# Patient Record
Sex: FEMALE | Race: WHITE | NOT HISPANIC OR LATINO | URBAN - METROPOLITAN AREA
[De-identification: names, ages, dates, MRNs, and addresses within clinical notes are randomized per-mention and may not be internally consistent; named-entity substitution may affect disease eponyms.]

---

## 2020-12-15 PROBLEM — F90.9 ADULT ATTENTION DEFICIT HYPERACTIVITY DISORDER: Status: ACTIVE | Noted: 2017-10-02

## 2021-07-01 ENCOUNTER — PATIENT MESSAGE (OUTPATIENT)
Dept: ADMINISTRATIVE | Facility: OTHER | Age: 36
End: 2021-07-01

## 2022-01-10 PROBLEM — M54.50 LOW BACK PAIN: Status: ACTIVE | Noted: 2019-12-15

## 2022-01-10 PROBLEM — F41.9 ANXIETY: Status: ACTIVE | Noted: 2022-01-10

## 2022-01-10 PROBLEM — F90.0 ATTENTION DEFICIT HYPERACTIVITY DISORDER (ADHD), PREDOMINANTLY INATTENTIVE TYPE: Status: ACTIVE | Noted: 2022-01-10

## 2024-12-11 ENCOUNTER — OFFICE VISIT (OUTPATIENT)
Facility: CLINIC | Age: 39
End: 2024-12-11
Payer: COMMERCIAL

## 2024-12-11 DIAGNOSIS — J34.89 OTHER SPECIFIED DISORDERS OF NOSE AND NASAL SINUSES: Primary | ICD-10-CM

## 2024-12-11 DIAGNOSIS — J30.89 NON-SEASONAL ALLERGIC RHINITIS, UNSPECIFIED TRIGGER: ICD-10-CM

## 2024-12-11 DIAGNOSIS — J32.0 CHRONIC MAXILLARY SINUSITIS: ICD-10-CM

## 2024-12-11 DIAGNOSIS — R43.8 HYPOSMIA: ICD-10-CM

## 2024-12-11 PROCEDURE — 1159F MED LIST DOCD IN RCRD: CPT | Mod: CPTII,S$GLB,, | Performed by: ORTHOPAEDIC SURGERY

## 2024-12-11 PROCEDURE — 99204 OFFICE O/P NEW MOD 45 MIN: CPT | Mod: S$GLB,,, | Performed by: ORTHOPAEDIC SURGERY

## 2024-12-11 NOTE — PROGRESS NOTES
Subjective:      Patient ID: Shereen Rosenbaum is a 39 y.o. female.    Chief Complaint: Sinus Problem (Ongoing sinus congestion - states does not feel like her nose looks normal - thinks there are string/threads in her nose that move and feels like something is crawling inside.)    Patient is a 39 year old female seen today for evaluation of chronic sinusitis.  She has been having issues with nasal obstruction and unusual mucus since 2021.  She has been having a sensation of irritation in her nose, and on exam she has noted increased inflammation.  She also has noted an itching and crawling sensation in her nose.  She has increased nasal obstruction, decreased sense of smell.  She has noted thin, white and stringy mucus in her nose that has concerned her.  She has no formal diagnosis of asthma, but has an inhaler that she uses when she has a restriction to her breathing and she does find it to be helpful.  She has never required allergy testing in the past.  She does not smoke.  She has seen Dr. Julio prior, is here for second opinion.          Review of Systems   HENT:  Positive for congestion and rhinorrhea.    Allergic/Immunologic: Positive for environmental allergies.       Objective:       Physical Exam  Constitutional:       General: She is not in acute distress.     Appearance: She is well-developed.   HENT:      Head: Normocephalic and atraumatic.      Right Ear: Tympanic membrane, ear canal and external ear normal.      Left Ear: Tympanic membrane, ear canal and external ear normal.      Nose: Mucosal edema present. No nasal deformity, septal deviation or rhinorrhea.      Right Sinus: No maxillary sinus tenderness or frontal sinus tenderness.      Left Sinus: No maxillary sinus tenderness or frontal sinus tenderness.      Comments: Able to see head of middle turbinates bilaterally, possible tamela bullosa left middle turbinate     Mouth/Throat:      Mouth: Mucous membranes are not pale and not dry.       Dentition: No dental caries.      Pharynx: Uvula midline. No oropharyngeal exudate or posterior oropharyngeal erythema.   Eyes:      General: Lids are normal. No scleral icterus.     Extraocular Movements:      Right eye: Normal extraocular motion and no nystagmus.      Left eye: Normal extraocular motion and no nystagmus.      Conjunctiva/sclera: Conjunctivae normal.      Right eye: Right conjunctiva is not injected. No chemosis.     Left eye: Left conjunctiva is not injected. No chemosis.     Pupils: Pupils are equal, round, and reactive to light.   Neck:      Thyroid: No thyroid mass or thyromegaly.      Trachea: Trachea and phonation normal. No tracheal tenderness or tracheal deviation.   Pulmonary:      Effort: Pulmonary effort is normal. No respiratory distress.      Breath sounds: No stridor.   Abdominal:      General: There is no distension.   Lymphadenopathy:      Head:      Right side of head: No submental, submandibular, preauricular, posterior auricular or occipital adenopathy.      Left side of head: No submental, submandibular, preauricular, posterior auricular or occipital adenopathy.      Cervical: No cervical adenopathy.   Skin:     General: Skin is warm and dry.      Findings: No erythema or rash.   Neurological:      Mental Status: She is alert and oriented to person, place, and time.      Cranial Nerves: No cranial nerve deficit.   Psychiatric:         Behavior: Behavior normal.         Assessment:       1. Other specified disorders of nose and nasal sinuses    2. Chronic maxillary sinusitis    3. Hyposmia    4. Non-seasonal allergic rhinitis, unspecified trigger        Plan:     Other specified disorders of nose and nasal sinuses  -     CT Sinuses without Contrast; Future; Expected date: 12/11/2024    Chronic maxillary sinusitis  -     Ambulatory referral/consult to ENT  -     CT Sinuses without Contrast; Future; Expected date: 12/11/2024    Hyposmia  -     CT Sinuses without Contrast; Future;  Expected date: 12/11/2024    Non-seasonal allergic rhinitis, unspecified trigger    Patient has been having increasing sinonasal symptoms over the past three years.  We discussed that this is likely secondary to increased nasal inflammation, which can be a result of infection vs allergy.  With the duration of her symptoms and new onset of hyposmia, I would recommend a CT sinus.  Will schedule at [facility] and will review results when complete.  If her scan does not show any anatomic abnormality of the sinuses or active infection, then symptoms are most likely related to underlying allergy.  Next step at that point would be to consider evaluation with allergy for skin testing.  In the interim, will have her start with nasal saline spray twice daily as well as a nasal steroid spray.  We did discuss that nasal steroid sprays require use for 10-14 days before they achieve maximal effectiveness.  Will contact PCP to see if they would prefer local referral for allergy if indicated.

## 2024-12-11 NOTE — Clinical Note
Thank you for the referral- sending her for CT sinus.  If clear, I would recommend allergy testing- do you have someone local you prefer?  If not I can set her up with our [facility] group.  Thank you again and happy holidays!

## 2024-12-19 ENCOUNTER — TELEPHONE (OUTPATIENT)
Dept: OTOLARYNGOLOGY | Facility: CLINIC | Age: 39
End: 2024-12-19
Payer: COMMERCIAL

## 2024-12-19 NOTE — TELEPHONE ENCOUNTER
Per Dr. Lewis note dated 12/11/24:   I would recommend a CT sinus. Will schedule at [facility] and will review results when complete.     Has this been done and do we have these results?        ----- Message from Roel Hernandez sent at 12/18/2024  4:17 PM CST -----  Contact: [phone]    ----- Message -----  From: Alma Rosa Mi  Sent: 12/18/2024   4:03 PM CST  To: Debbie Soni Ent Staff    Patient is calling for LAB results. Please call patient at [phone]. Thanks KB

## 2024-12-19 NOTE — TELEPHONE ENCOUNTER
----- Message from Rociopravin sent at 12/19/2024  3:25 PM CST -----  Contact: [phone]  Type:  Needs Medical Advice    Who Called: caryl   Symptoms (please be specific):   Chronic maxillary sinusitis [J32.0]  Other specified disorders of nose and nasal sinuses [J34.89]  Hyposmia [R43.8]  How long has patient had these symptoms:  n/a   Pharmacy name and phone #:      [facility] [address] [place] [address] [address] [address]  Phone: [phone] Fax: [phone]   Would the patient rather a call back or a response via [facility]? Call Back  Best Call Back Number: [phone]   Additional Information: pt is calling in regards to her ct order. Pt stated that the insurance office has not received any orders to do the authorization.       Thanks KB

## 2024-12-19 NOTE — TELEPHONE ENCOUNTER
"Attempted to contact pt.  Call connects but could not hear anyone.  Pt was ordered to have CT by Dr Lewis; Pt prefers to have it completed at [facility] but they will not schedule until [facility] has obtained the prior authorization.  "Dummy" date was placed on order to trigger submission to insurance.  Do not see any documentation that it was submitted to the insurance.  Direct message sent to Kira Hu in pre auth requesting assistance.  "

## 2024-12-24 ENCOUNTER — TELEPHONE (OUTPATIENT)
Dept: OTOLARYNGOLOGY | Facility: CLINIC | Age: 39
End: 2024-12-24
Payer: COMMERCIAL

## 2024-12-24 DIAGNOSIS — J32.0 CHRONIC MAXILLARY SINUSITIS: Primary | ICD-10-CM

## 2024-12-24 RX ORDER — MUPIROCIN 20 MG/G
OINTMENT TOPICAL 2 TIMES DAILY
Qty: 15 G | Refills: 3 | Status: SHIPPED | OUTPATIENT
Start: 2024-12-24 | End: 2025-01-03

## 2024-12-24 RX ORDER — MUPIROCIN 20 MG/G
OINTMENT TOPICAL 2 TIMES DAILY
Qty: 15 G | Refills: 3 | Status: SHIPPED | OUTPATIENT
Start: 2024-12-24 | End: 2024-12-24 | Stop reason: SDUPTHER

## 2024-12-24 NOTE — TELEPHONE ENCOUNTER
Has she had her CT of the sinuses yet?  We ordered it to be done at \[facility]\"" in [place].  Please reassure her that there was no evidence of parasites in her nose on exam.  I would recommend she use saline spray and may also add a topical antibiotic ointment to help with dryness, which can often have a crawling/itching sensation.  I have sent some bactroban to her pharmacy.

## 2025-01-06 ENCOUNTER — TELEPHONE (OUTPATIENT)
Dept: OTOLARYNGOLOGY | Facility: CLINIC | Age: 40
End: 2025-01-06
Payer: COMMERCIAL

## 2025-01-06 NOTE — TELEPHONE ENCOUNTER
----- Message from Kasey sent at 1/6/2025 11:28 AM CST -----  Contact: Shereen Regan is calling to speak to the nurse regarding the Ct of the Sinus, she would like the order faxed over along with the authorization to [facility] the fax number is , if  any questions please give her a call at [phone]      Thanks  LJ

## 2025-01-15 ENCOUNTER — TELEPHONE (OUTPATIENT)
Dept: OTOLARYNGOLOGY | Facility: CLINIC | Age: 40
End: 2025-01-15
Payer: COMMERCIAL

## 2025-01-15 NOTE — TELEPHONE ENCOUNTER
----- Message from Roel Buenrostro sent at 1/14/2025  5:03 PM CST -----  Contact: Shereen    ----- Message -----  From: Kasey Newman  Sent: 1/14/2025   3:36 PM CST  To: Debbie Soni Ent Staff    Shereen is calling to speak to the nurse regarding Ct scan results, please give her a call at [phone]     Thanks  LJ

## 2025-01-15 NOTE — TELEPHONE ENCOUNTER
Ct results received from [facility] and scanned into media tab for review.

## 2025-01-16 ENCOUNTER — PATIENT MESSAGE (OUTPATIENT)
Dept: OTOLARYNGOLOGY | Facility: CLINIC | Age: 40
End: 2025-01-16
Payer: COMMERCIAL

## 2025-01-16 NOTE — TELEPHONE ENCOUNTER
Please let the patient know that we have received her results from her CT of the sinuses, and the good news is that it is perfectly clear.  This means that she does not have an infection or other anatomic abnormality of her sinuses as the cause for her symptoms.  She is having significant nasal inflammation and other symptoms, as we discussed in clinic she would likely benefit from a formal allergy evaluation.  I am happy to refer her to our allergist in [place], but she may wish to check with Dr. Welch to see if there is someone local he would recommend.

## 2025-01-17 ENCOUNTER — TELEPHONE (OUTPATIENT)
Dept: OTOLARYNGOLOGY | Facility: CLINIC | Age: 40
End: 2025-01-17
Payer: COMMERCIAL

## 2025-01-17 NOTE — TELEPHONE ENCOUNTER
----- Message from Kasey sent at 1/17/2025  4:50 PM CST -----  Contact: Shereen  Type:  Patient Returning Call    Who Called:Shereen  Who Left Message for Patient:Chantale  Does the patient know what this is regarding?:Ct results  Would the patient rather a call back or a response via [facility]? Call back  Best Call Back Number:  Additional Information: Shereen would like a call back    Thanks  ESTER

## 2025-01-24 ENCOUNTER — TELEPHONE (OUTPATIENT)
Dept: OTOLARYNGOLOGY | Facility: CLINIC | Age: 40
End: 2025-01-24
Payer: COMMERCIAL

## 2025-01-24 NOTE — TELEPHONE ENCOUNTER
----- Message from Kasey sent at 1/23/2025  3:38 PM CST -----  Contact: Shereen Regan is calling to speak to the nurse regarding results, she is very demanding someone call her back asap, because she need further treatment, please give her a call at  [phone]      Thanks   LJ

## 2025-01-27 ENCOUNTER — TELEPHONE (OUTPATIENT)
Dept: OTOLARYNGOLOGY | Facility: CLINIC | Age: 40
End: 2025-01-27
Payer: COMMERCIAL

## 2025-01-27 NOTE — TELEPHONE ENCOUNTER
Spoke with pt . Pt was given results and she voiced understanding the patient states she will get back in touch with us after she talks to Dr Welch   
Dr. ann

## 2025-02-21 ENCOUNTER — TELEPHONE (OUTPATIENT)
Dept: OTOLARYNGOLOGY | Facility: CLINIC | Age: 40
End: 2025-02-21
Payer: COMMERCIAL

## 2025-02-21 NOTE — TELEPHONE ENCOUNTER
Spoke to pt and an appt was scheduled for 03/18/25 at 1:30. Pt concerned would have to schedule another appt for the scope. Informed could be done on same day as appt. Voiced understanding.

## 2025-03-10 ENCOUNTER — TELEPHONE (OUTPATIENT)
Dept: OTOLARYNGOLOGY | Facility: CLINIC | Age: 40
End: 2025-03-10
Payer: COMMERCIAL

## 2025-03-10 NOTE — TELEPHONE ENCOUNTER
----- Message from Kasey sent at 3/10/2025  4:38 PM CDT -----  Contact: Shereen Regan is calling to speak to the nurse regarding nose swelling, she stated no one want to believe her but her nose looks deformed, she is requesting a call back to speak to the nurse at [phone]

## 2025-03-10 NOTE — TELEPHONE ENCOUNTER
Spoke with pt  pt states her nose burns ,swelling , headaches . Pt states her nose is looking deformed and she feels her head will explode and she is having trouble breathing.   Pt was informed that if she is having trouble breathing she needs to go to UC or the ER   Pt states she does not need either at this time cause she can now breath

## 2025-04-30 ENCOUNTER — TELEPHONE (OUTPATIENT)
Dept: OTOLARYNGOLOGY | Facility: CLINIC | Age: 40
End: 2025-04-30
Payer: COMMERCIAL

## 2025-04-30 NOTE — TELEPHONE ENCOUNTER
----- Message from Nae Lewis MD sent at 4/30/2025 12:32 PM CDT -----  Her CT was clear, which is good news.  Add nasal saline, and consider evaluation with an allergist if she has not already done so.  ----- Message -----  From: Canelo Kay MA  Sent: 4/30/2025  11:08 AM CDT  To: Nae Lewis MD    Good morning,Pt was scheduled with you on tomorrow but pt states she is having some transportation issues at the moment. Pt asked what else can she do to get the swelling in her nose to go down?

## 2025-06-12 ENCOUNTER — PATIENT MESSAGE (OUTPATIENT)
Dept: OTOLARYNGOLOGY | Facility: CLINIC | Age: 40
End: 2025-06-12